# Patient Record
Sex: MALE | Race: WHITE | NOT HISPANIC OR LATINO | Employment: FULL TIME | URBAN - METROPOLITAN AREA
[De-identification: names, ages, dates, MRNs, and addresses within clinical notes are randomized per-mention and may not be internally consistent; named-entity substitution may affect disease eponyms.]

---
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
## 2021-01-04 ENCOUNTER — AMBULATORY - HEALTHEAST (OUTPATIENT)
Dept: OTHER | Facility: CLINIC | Age: 21
End: 2021-01-04

## 2022-12-08 ENCOUNTER — APPOINTMENT (OUTPATIENT)
Dept: CT IMAGING | Facility: HOSPITAL | Age: 22
End: 2022-12-08
Attending: EMERGENCY MEDICINE
Payer: COMMERCIAL

## 2022-12-08 ENCOUNTER — HOSPITAL ENCOUNTER (EMERGENCY)
Facility: HOSPITAL | Age: 22
Discharge: HOME | End: 2022-12-08
Attending: EMERGENCY MEDICINE | Admitting: EMERGENCY MEDICINE
Payer: COMMERCIAL

## 2022-12-08 VITALS
TEMPERATURE: 97.3 F | HEIGHT: 67 IN | BODY MASS INDEX: 24.33 KG/M2 | HEART RATE: 68 BPM | OXYGEN SATURATION: 99 % | DIASTOLIC BLOOD PRESSURE: 93 MMHG | SYSTOLIC BLOOD PRESSURE: 132 MMHG | RESPIRATION RATE: 18 BRPM | WEIGHT: 154.98 LBS

## 2022-12-08 DIAGNOSIS — R10.31 RIGHT LOWER QUADRANT ABDOMINAL PAIN: ICD-10-CM

## 2022-12-08 LAB
ALBUMIN SERPL BCG-MCNC: 4.3 G/DL (ref 3.5–5.2)
ALBUMIN UR-MCNC: 10 MG/DL
ALP SERPL-CCNC: 67 U/L (ref 40–129)
ALT SERPL W P-5'-P-CCNC: 20 U/L (ref 10–50)
ANION GAP SERPL CALCULATED.3IONS-SCNC: 8 MMOL/L (ref 7–15)
APPEARANCE UR: ABNORMAL
AST SERPL W P-5'-P-CCNC: 22 U/L (ref 10–50)
BASOPHILS # BLD AUTO: 0 10E3/UL (ref 0–0.2)
BASOPHILS NFR BLD AUTO: 0 %
BILIRUB SERPL-MCNC: 0.4 MG/DL
BILIRUB UR QL STRIP: NEGATIVE
BUN SERPL-MCNC: 11 MG/DL (ref 6–20)
CALCIUM SERPL-MCNC: 9 MG/DL (ref 8.6–10)
CHLORIDE SERPL-SCNC: 103 MMOL/L (ref 98–107)
COLOR UR AUTO: YELLOW
CREAT SERPL-MCNC: 0.95 MG/DL (ref 0.67–1.17)
DEPRECATED HCO3 PLAS-SCNC: 28 MMOL/L (ref 22–29)
EOSINOPHIL # BLD AUTO: 0.1 10E3/UL (ref 0–0.7)
EOSINOPHIL NFR BLD AUTO: 1 %
ERYTHROCYTE [DISTWIDTH] IN BLOOD BY AUTOMATED COUNT: 12.1 % (ref 10–15)
GFR SERPL CREATININE-BSD FRML MDRD: >90 ML/MIN/1.73M2
GLUCOSE SERPL-MCNC: 103 MG/DL (ref 70–99)
GLUCOSE UR STRIP-MCNC: NEGATIVE MG/DL
HCT VFR BLD AUTO: 40.7 % (ref 40–53)
HGB BLD-MCNC: 14.1 G/DL (ref 13.3–17.7)
HGB UR QL STRIP: NEGATIVE
IMM GRANULOCYTES # BLD: 0 10E3/UL
IMM GRANULOCYTES NFR BLD: 0 %
KETONES UR STRIP-MCNC: NEGATIVE MG/DL
LEUKOCYTE ESTERASE UR QL STRIP: ABNORMAL
LIPASE SERPL-CCNC: 24 U/L (ref 13–60)
LYMPHOCYTES # BLD AUTO: 2.9 10E3/UL (ref 0.8–5.3)
LYMPHOCYTES NFR BLD AUTO: 29 %
MCH RBC QN AUTO: 32 PG (ref 26.5–33)
MCHC RBC AUTO-ENTMCNC: 34.6 G/DL (ref 31.5–36.5)
MCV RBC AUTO: 93 FL (ref 78–100)
MONOCYTES # BLD AUTO: 1.2 10E3/UL (ref 0–1.3)
MONOCYTES NFR BLD AUTO: 12 %
MUCOUS THREADS #/AREA URNS LPF: PRESENT /LPF
NEUTROPHILS # BLD AUTO: 5.6 10E3/UL (ref 1.6–8.3)
NEUTROPHILS NFR BLD AUTO: 58 %
NITRATE UR QL: NEGATIVE
NRBC # BLD AUTO: 0 10E3/UL
NRBC BLD AUTO-RTO: 0 /100
PH UR STRIP: 6.5 [PH] (ref 5–7)
PLATELET # BLD AUTO: 307 10E3/UL (ref 150–450)
POTASSIUM SERPL-SCNC: 4.2 MMOL/L (ref 3.4–5.3)
PROT SERPL-MCNC: 7.1 G/DL (ref 6.4–8.3)
RBC # BLD AUTO: 4.4 10E6/UL (ref 4.4–5.9)
RBC URINE: 1 /HPF
SODIUM SERPL-SCNC: 139 MMOL/L (ref 136–145)
SP GR UR STRIP: 1.03 (ref 1–1.03)
UROBILINOGEN UR STRIP-MCNC: 2 MG/DL
WBC # BLD AUTO: 9.8 10E3/UL (ref 4–11)
WBC URINE: 4 /HPF

## 2022-12-08 PROCEDURE — 99285 EMERGENCY DEPT VISIT HI MDM: CPT | Mod: 25

## 2022-12-08 PROCEDURE — 85025 COMPLETE CBC W/AUTO DIFF WBC: CPT | Performed by: EMERGENCY MEDICINE

## 2022-12-08 PROCEDURE — 36415 COLL VENOUS BLD VENIPUNCTURE: CPT | Performed by: EMERGENCY MEDICINE

## 2022-12-08 PROCEDURE — 250N000011 HC RX IP 250 OP 636: Performed by: EMERGENCY MEDICINE

## 2022-12-08 PROCEDURE — 81001 URINALYSIS AUTO W/SCOPE: CPT | Performed by: EMERGENCY MEDICINE

## 2022-12-08 PROCEDURE — 74177 CT ABD & PELVIS W/CONTRAST: CPT

## 2022-12-08 PROCEDURE — 83690 ASSAY OF LIPASE: CPT | Performed by: EMERGENCY MEDICINE

## 2022-12-08 PROCEDURE — 258N000003 HC RX IP 258 OP 636: Performed by: EMERGENCY MEDICINE

## 2022-12-08 PROCEDURE — 96361 HYDRATE IV INFUSION ADD-ON: CPT

## 2022-12-08 PROCEDURE — 96375 TX/PRO/DX INJ NEW DRUG ADDON: CPT

## 2022-12-08 PROCEDURE — 80053 COMPREHEN METABOLIC PANEL: CPT | Performed by: EMERGENCY MEDICINE

## 2022-12-08 PROCEDURE — 250N000013 HC RX MED GY IP 250 OP 250 PS 637: Performed by: EMERGENCY MEDICINE

## 2022-12-08 PROCEDURE — 96374 THER/PROPH/DIAG INJ IV PUSH: CPT

## 2022-12-08 RX ORDER — DICYCLOMINE HCL 20 MG
20 TABLET ORAL 4 TIMES DAILY PRN
Qty: 10 TABLET | Refills: 0 | Status: SHIPPED | OUTPATIENT
Start: 2022-12-08 | End: 2022-12-18

## 2022-12-08 RX ORDER — HYDROMORPHONE HYDROCHLORIDE 1 MG/ML
0.5 INJECTION, SOLUTION INTRAMUSCULAR; INTRAVENOUS; SUBCUTANEOUS
Status: COMPLETED | OUTPATIENT
Start: 2022-12-08 | End: 2022-12-08

## 2022-12-08 RX ORDER — HYDROCODONE BITARTRATE AND ACETAMINOPHEN 5; 325 MG/1; MG/1
1 TABLET ORAL ONCE
Status: COMPLETED | OUTPATIENT
Start: 2022-12-08 | End: 2022-12-08

## 2022-12-08 RX ORDER — ONDANSETRON 2 MG/ML
4 INJECTION INTRAMUSCULAR; INTRAVENOUS EVERY 30 MIN PRN
Status: DISCONTINUED | OUTPATIENT
Start: 2022-12-08 | End: 2022-12-08 | Stop reason: HOSPADM

## 2022-12-08 RX ORDER — SODIUM CHLORIDE 9 MG/ML
INJECTION, SOLUTION INTRAVENOUS CONTINUOUS
Status: DISCONTINUED | OUTPATIENT
Start: 2022-12-08 | End: 2022-12-08 | Stop reason: HOSPADM

## 2022-12-08 RX ORDER — IOPAMIDOL 755 MG/ML
100 INJECTION, SOLUTION INTRAVASCULAR ONCE
Status: COMPLETED | OUTPATIENT
Start: 2022-12-08 | End: 2022-12-08

## 2022-12-08 RX ORDER — ONDANSETRON 4 MG/1
4 TABLET, ORALLY DISINTEGRATING ORAL EVERY 6 HOURS PRN
Qty: 10 TABLET | Refills: 0 | Status: SHIPPED | OUTPATIENT
Start: 2022-12-08 | End: 2022-12-15

## 2022-12-08 RX ORDER — KETOROLAC TROMETHAMINE 15 MG/ML
15 INJECTION, SOLUTION INTRAMUSCULAR; INTRAVENOUS ONCE
Status: COMPLETED | OUTPATIENT
Start: 2022-12-08 | End: 2022-12-08

## 2022-12-08 RX ADMIN — SODIUM CHLORIDE: 9 INJECTION, SOLUTION INTRAVENOUS at 01:38

## 2022-12-08 RX ADMIN — HYDROMORPHONE HYDROCHLORIDE 0.5 MG: 1 INJECTION, SOLUTION INTRAMUSCULAR; INTRAVENOUS; SUBCUTANEOUS at 01:54

## 2022-12-08 RX ADMIN — IOPAMIDOL 100 ML: 755 INJECTION, SOLUTION INTRAVENOUS at 02:22

## 2022-12-08 RX ADMIN — KETOROLAC TROMETHAMINE 15 MG: 15 INJECTION, SOLUTION INTRAMUSCULAR; INTRAVENOUS at 00:57

## 2022-12-08 RX ADMIN — ONDANSETRON 4 MG: 2 INJECTION INTRAMUSCULAR; INTRAVENOUS at 00:58

## 2022-12-08 RX ADMIN — HYDROCODONE BITARTRATE AND ACETAMINOPHEN 1 TABLET: 5; 325 TABLET ORAL at 03:10

## 2022-12-08 RX ADMIN — SODIUM CHLORIDE 1000 ML: 9 INJECTION, SOLUTION INTRAVENOUS at 00:58

## 2022-12-08 ASSESSMENT — ENCOUNTER SYMPTOMS
NAUSEA: 1
FEVER: 0
VOMITING: 0
ABDOMINAL PAIN: 1
DIARRHEA: 0
BACK PAIN: 0

## 2022-12-08 ASSESSMENT — ACTIVITIES OF DAILY LIVING (ADL)
ADLS_ACUITY_SCORE: 37
ADLS_ACUITY_SCORE: 37

## 2022-12-08 NOTE — ED PROVIDER NOTES
NAME: José Luis Baron  AGE: 22 year old male  YOB: 2000  MRN: [id number]  EVALUATION DATE & TIME: No admission date for patient encounter.    PCP: Ely Feldman    ED PROVIDER: Job Corley M.D.      Chief Complaint   Patient presents with     Abdominal Pain     FINAL IMPRESSION:  1. Right lower quadrant abdominal pain      MEDICAL DECISION MAKIN:30 AM I met with the patient, obtained history, performed an initial exam, and discussed options and plan for diagnostics and treatment here in the ED. PPE worn: n95 mask, gloves    1:42 AM I rechecked the patient. Patient has had some pain relief with the Toradol. Will obtain urine sample and further imaging.   2:58 AM I updated the patient. Patient is agreeable to plan and will discharge.   Patient was clinically assessed and consented to treatment. After assessment, medical decision making and workup were discussed with the patient. The patient was agreeable to plan for testing, workup, and treatment.  Pertinent Labs & Imaging studies reviewed. (See chart for details)     Medical Decision Making    History:    Supplemental history from: N/A    External Record(s) reviewed: Documented in HPI, if applicable.    Work Up:    Chart documentation includes differential considered and any EKGs or imaging interpreted by provider.    In additional to work up documented, I considered the following work up: See chart documentation, if applicable.    External consultation:    Discussion of management with another provider: See chart documentation, if applicable    Complicating factors:    Care impacted by chronic illness: Smoking / Nicotine Use    Care affected by social determinants of health: N/A    Disposition considerations: Discharge. I prescribed additional prescription strength medication(s) as charted. I considered admission, but discharged patient after reassuring labs and/or imaging.    José Luis Baron is a 22 year old male who presents  with abdominal pain.   Differential diagnosis includes but not limited to appendicitis, cholecystitis, biliary colic, epiploic appendagitis, kidney stone, urinary tract infection.  Patient with episodes of pain that are sharp or on the right side.  He reports slightly towards the lower quadrant but when questioned he points between upper and lower quadrant on the right side.  Patient could have retrocolic appendicitis or possibly diverticulitis.  Given the sharp nature kidney stones are also possibility.  Labs were drawn and patient was given Toradol for pain which did allow for some relief.  Labs showed unremarkable CBC, negative lipase for pancreatitis, and metabolic panel without any evidence of kidney issues or LFT elevation.  Given the normal labs will await urine.  Urinalysis showed some leukocytes but no definitive sign of infection with only 4 white blood cells.  No RBCs and could possibly be kidney stone still with no hematuria however will proceed with CT scan with contrast.  CT with contrast showed normal appendix, no signs of acute inflammatory acute surgical process.  Patient's pain possibly bowel related such as bowel gas or gastroenteritis though he does not have any diarrhea.  He did have some slight nausea but no vomiting.  Kidney stone that has already passed is a possibility however urine does not show definitive hematuria.  I discussed all these possibilities with patient and the pain.  Patient pain was improved after single dose pain medication here in the ER.  Allowing for pain medication wore off he still was comfortable and will plan for discharge home with pain control and antiemetics.  Patient will return to the ER if not improving the next 24 hours and follow-up with his primary clinic.    0 minutes of critical care time    MEDICATIONS GIVEN IN THE EMERGENCY:  Medications   0.9% sodium chloride BOLUS (0 mLs Intravenous Stopped 12/8/22 0138)     Followed by   sodium chloride 0.9% infusion  (0 mLs Intravenous Stopped 12/8/22 0311)   ondansetron (ZOFRAN) injection 4 mg (4 mg Intravenous Given 12/8/22 0058)   ketorolac (TORADOL) injection 15 mg (15 mg Intravenous Given 12/8/22 0057)   HYDROmorphone (PF) (DILAUDID) injection 0.5 mg (0.5 mg Intravenous Given 12/8/22 0154)   iopamidol (ISOVUE-370) solution 100 mL (100 mLs Intravenous Given 12/8/22 0222)   HYDROcodone-acetaminophen (NORCO) 5-325 MG per tablet 1 tablet (1 tablet Oral Given 12/8/22 0310)       NEW PRESCRIPTIONS STARTED AT TODAY'S ER VISIT:  Discharge Medication List as of 12/8/2022  3:15 AM      START taking these medications    Details   dicyclomine (BENTYL) 20 MG tablet Take 1 tablet (20 mg) by mouth 4 times daily as needed (abdominal pain, cramps.), Disp-10 tablet, R-0, Local Print      ondansetron (ZOFRAN ODT) 4 MG ODT tab Take 1 tablet (4 mg) by mouth every 6 hours as needed for nausea, Disp-10 tablet, R-0, Local Print                =================================================================    HPI    Patient information was obtained from: Patient     Use of : N/A         José Luis Baron is a 22 year old male with a past medical history of nicotine use disorder, who presents abdominal pain.    Yesterday, the patient reports developing upper abdominal pain. The patient states that his pain was able to go away after taking some time to sleep, but at 12:00 AM (~30 minutes ago), the abdominal pain returned. He adds that his abdominal pain has since radiated to his RLQ, especially when he is laying on his right side. His abdominal pain is sharp and constant in nature. He denies radiation to his back and groin/testicular region. The patient notes that staying in the fetal position helps his abdominal pain minimally. He also endorses some nausea when standing up, but denies vomiting and diarrhea. He has never had these symptoms before and denies a history of abdominal surgeries. He took Tylenol earlier today, but had no relief.  "He otherwise denies fevers and any other symptoms or complaints at this time.    REVIEW OF SYSTEMS   Review of Systems   Constitutional: Negative for fever.   Gastrointestinal: Positive for abdominal pain and nausea. Negative for diarrhea and vomiting.   Genitourinary: Negative for penile pain and testicular pain.   Musculoskeletal: Negative for back pain.   All other systems reviewed and are negative.     PAST MEDICAL HISTORY:  Past Medical History:   Diagnosis Date     Concussion        PAST SURGICAL HISTORY:  Past Surgical History:   Procedure Laterality Date     Left thumb fracture and dislocation         CURRENT MEDICATIONS:      Current Facility-Administered Medications:      ondansetron (ZOFRAN) injection 4 mg, 4 mg, Intravenous, Q30 Min PRN, Job Corley MD, 4 mg at 12/08/22 0058     [COMPLETED] 0.9% sodium chloride BOLUS, 1,000 mL, Intravenous, Once, Stopped at 12/08/22 0138 **FOLLOWED BY** sodium chloride 0.9% infusion, , Intravenous, Continuous, Job Corley MD, Stopped at 12/08/22 0311    Current Outpatient Medications:      dicyclomine (BENTYL) 20 MG tablet, Take 1 tablet (20 mg) by mouth 4 times daily as needed (abdominal pain, cramps.), Disp: 10 tablet, Rfl: 0     ondansetron (ZOFRAN ODT) 4 MG ODT tab, Take 1 tablet (4 mg) by mouth every 6 hours as needed for nausea, Disp: 10 tablet, Rfl: 0    ALLERGIES:  No Known Allergies    FAMILY HISTORY:  Family History   Problem Relation Age of Onset     Asthma Brother        SOCIAL HISTORY:   Social History     Socioeconomic History     Marital status: Single   Tobacco Use     Smoking status: Every Day     PHYSICAL EXAM:    Vitals: BP (!) 132/93   Pulse 68   Temp 97.3  F (36.3  C) (Temporal)   Resp 18   Ht 1.702 m (5' 7\")   Wt 70.3 kg (154 lb 15.7 oz)   SpO2 99%   BMI 24.27 kg/m     Physical Exam  Vitals and nursing note reviewed.   Constitutional:       General: He is not in acute distress.     Appearance: He is " well-developed and normal weight. He is not ill-appearing, toxic-appearing or diaphoretic.   HENT:      Head: Normocephalic.   Eyes:      General: No scleral icterus.  Cardiovascular:      Rate and Rhythm: Normal rate and regular rhythm.      Heart sounds: Normal heart sounds.   Pulmonary:      Effort: Pulmonary effort is normal. No respiratory distress.      Breath sounds: Normal breath sounds. No stridor.   Abdominal:      General: Abdomen is flat.      Palpations: Abdomen is soft.      Tenderness: There is abdominal tenderness (Mid right sided abdominal tenderness) in the right upper quadrant and right lower quadrant. There is no right CVA tenderness, left CVA tenderness, guarding or rebound.      Hernia: No hernia is present.   Skin:     General: Skin is warm and dry.      Coloration: Skin is not jaundiced.   Neurological:      General: No focal deficit present.      Mental Status: He is alert.   Psychiatric:         Mood and Affect: Mood normal.           LAB:  All pertinent labs reviewed and interpreted.  Labs Ordered and Resulted from Time of ED Arrival to Time of ED Departure   COMPREHENSIVE METABOLIC PANEL - Abnormal       Result Value    Sodium 139      Potassium 4.2      Chloride 103      Carbon Dioxide (CO2) 28      Anion Gap 8      Urea Nitrogen 11.0      Creatinine 0.95      Calcium 9.0      Glucose 103 (*)     Alkaline Phosphatase 67      AST 22      ALT 20      Protein Total 7.1      Albumin 4.3      Bilirubin Total 0.4      GFR Estimate >90     ROUTINE UA WITH MICROSCOPIC REFLEX TO CULTURE - Abnormal    Color Urine Yellow      Appearance Urine Turbid (*)     Glucose Urine Negative      Bilirubin Urine Negative      Ketones Urine Negative      Specific Gravity Urine 1.030      Blood Urine Negative      pH Urine 6.5      Protein Albumin Urine 10 (*)     Urobilinogen Urine 2.0 (*)     Nitrite Urine Negative      Leukocyte Esterase Urine 25 Amaury/uL (*)     Mucus Urine Present (*)     RBC Urine 1      WBC  Urine 4     LIPASE - Normal    Lipase 24     CBC WITH PLATELETS AND DIFFERENTIAL    WBC Count 9.8      RBC Count 4.40      Hemoglobin 14.1      Hematocrit 40.7      MCV 93      MCH 32.0      MCHC 34.6      RDW 12.1      Platelet Count 307      % Neutrophils 58      % Lymphocytes 29      % Monocytes 12      % Eosinophils 1      % Basophils 0      % Immature Granulocytes 0      NRBCs per 100 WBC 0      Absolute Neutrophils 5.6      Absolute Lymphocytes 2.9      Absolute Monocytes 1.2      Absolute Eosinophils 0.1      Absolute Basophils 0.0      Absolute Immature Granulocytes 0.0      Absolute NRBCs 0.0         RADIOLOGY:  CT Abdomen Pelvis w Contrast   Final Result   IMPRESSION:    1.  No acute abnormality. No appendicitis or other bowel inflammation or obstruction.        PROCEDURES:   Procedures       I, Fritz Edgar, am serving as a scribe to document services personally performed by Dr. Job Corley  based on my observation and the provider's statements to me. I, Job Corley MD attest that Fritz Edgar is acting in a scribe capacity, has observed my performance of the services and has documented them in accordance with my direction.      Job Corley M.D.  Emergency Medicine  [facility] Emergency Department and [facility] Emergency Department     Job Corley MD  12/08/22 6047

## 2022-12-08 NOTE — ED TRIAGE NOTES
Sharp constant ABD pain starting yesterday morning. Went away after sleeping all day, came back at midnight. Sharp pain to RLQ that radiates to mid ABD at times. Fetal position helps a little. Bowel and bladder WNL. nauseous from time to time.      Triage Assessment     Row Name 12/08/22 0026       Triage Assessment (Adult)    Airway WDL WDL

## 2022-12-09 ENCOUNTER — HOSPITAL ENCOUNTER (EMERGENCY)
Facility: HOSPITAL | Age: 22
Discharge: HOME | End: 2022-12-09
Attending: EMERGENCY MEDICINE | Admitting: EMERGENCY MEDICINE
Payer: COMMERCIAL

## 2022-12-09 ENCOUNTER — APPOINTMENT (OUTPATIENT)
Dept: CT IMAGING | Facility: HOSPITAL | Age: 22
End: 2022-12-09
Attending: EMERGENCY MEDICINE
Payer: COMMERCIAL

## 2022-12-09 VITALS
HEART RATE: 72 BPM | SYSTOLIC BLOOD PRESSURE: 140 MMHG | BODY MASS INDEX: 23.54 KG/M2 | WEIGHT: 150 LBS | RESPIRATION RATE: 22 BRPM | OXYGEN SATURATION: 99 % | TEMPERATURE: 98.8 F | DIASTOLIC BLOOD PRESSURE: 76 MMHG | HEIGHT: 67 IN

## 2022-12-09 DIAGNOSIS — R42 DIZZINESS: ICD-10-CM

## 2022-12-09 LAB
ANION GAP SERPL CALCULATED.3IONS-SCNC: 13 MMOL/L (ref 7–15)
BUN SERPL-MCNC: 8.9 MG/DL (ref 6–20)
CALCIUM SERPL-MCNC: 9.2 MG/DL (ref 8.6–10)
CHLORIDE SERPL-SCNC: 101 MMOL/L (ref 98–107)
COHGB MFR BLD: 1.4 % (ref 0–1.5)
CREAT SERPL-MCNC: 0.87 MG/DL (ref 0.67–1.17)
DEPRECATED HCO3 PLAS-SCNC: 24 MMOL/L (ref 22–29)
ERYTHROCYTE [DISTWIDTH] IN BLOOD BY AUTOMATED COUNT: 12.2 % (ref 10–15)
GFR SERPL CREATININE-BSD FRML MDRD: >90 ML/MIN/1.73M2
GLUCOSE SERPL-MCNC: 113 MG/DL (ref 70–99)
HCT VFR BLD AUTO: 41 % (ref 40–53)
HGB BLD-MCNC: 14.3 G/DL (ref 13.3–17.7)
HOLD SPECIMEN: NORMAL
MAGNESIUM SERPL-MCNC: 1.9 MG/DL (ref 1.7–2.3)
MCH RBC QN AUTO: 32.1 PG (ref 26.5–33)
MCHC RBC AUTO-ENTMCNC: 34.9 G/DL (ref 31.5–36.5)
MCV RBC AUTO: 92 FL (ref 78–100)
PLATELET # BLD AUTO: 329 10E3/UL (ref 150–450)
POTASSIUM SERPL-SCNC: 4 MMOL/L (ref 3.4–5.3)
RBC # BLD AUTO: 4.46 10E6/UL (ref 4.4–5.9)
SODIUM SERPL-SCNC: 138 MMOL/L (ref 136–145)
TROPONIN T SERPL HS-MCNC: <6 NG/L
TSH SERPL DL<=0.005 MIU/L-ACNC: 2.31 UIU/ML (ref 0.3–4.2)
WBC # BLD AUTO: 10.5 10E3/UL (ref 4–11)

## 2022-12-09 PROCEDURE — 99285 EMERGENCY DEPT VISIT HI MDM: CPT | Mod: 25

## 2022-12-09 PROCEDURE — 96361 HYDRATE IV INFUSION ADD-ON: CPT

## 2022-12-09 PROCEDURE — 36415 COLL VENOUS BLD VENIPUNCTURE: CPT | Performed by: EMERGENCY MEDICINE

## 2022-12-09 PROCEDURE — 83735 ASSAY OF MAGNESIUM: CPT | Performed by: EMERGENCY MEDICINE

## 2022-12-09 PROCEDURE — 84443 ASSAY THYROID STIM HORMONE: CPT | Performed by: EMERGENCY MEDICINE

## 2022-12-09 PROCEDURE — 258N000003 HC RX IP 258 OP 636: Performed by: EMERGENCY MEDICINE

## 2022-12-09 PROCEDURE — 85027 COMPLETE CBC AUTOMATED: CPT | Performed by: EMERGENCY MEDICINE

## 2022-12-09 PROCEDURE — 250N000011 HC RX IP 250 OP 636: Performed by: EMERGENCY MEDICINE

## 2022-12-09 PROCEDURE — 84484 ASSAY OF TROPONIN QUANT: CPT | Performed by: EMERGENCY MEDICINE

## 2022-12-09 PROCEDURE — 70450 CT HEAD/BRAIN W/O DYE: CPT

## 2022-12-09 PROCEDURE — 250N000013 HC RX MED GY IP 250 OP 250 PS 637: Performed by: EMERGENCY MEDICINE

## 2022-12-09 PROCEDURE — 82375 ASSAY CARBOXYHB QUANT: CPT | Performed by: EMERGENCY MEDICINE

## 2022-12-09 PROCEDURE — 80048 BASIC METABOLIC PNL TOTAL CA: CPT | Performed by: EMERGENCY MEDICINE

## 2022-12-09 PROCEDURE — 36415 COLL VENOUS BLD VENIPUNCTURE: CPT | Performed by: STUDENT IN AN ORGANIZED HEALTH CARE EDUCATION/TRAINING PROGRAM

## 2022-12-09 PROCEDURE — 96374 THER/PROPH/DIAG INJ IV PUSH: CPT

## 2022-12-09 PROCEDURE — 93005 ELECTROCARDIOGRAM TRACING: CPT | Performed by: EMERGENCY MEDICINE

## 2022-12-09 RX ORDER — MECLIZINE HCL 12.5 MG 12.5 MG/1
25 TABLET ORAL ONCE
Status: COMPLETED | OUTPATIENT
Start: 2022-12-09 | End: 2022-12-09

## 2022-12-09 RX ORDER — CEFTRIAXONE 2 G/1
2 INJECTION, POWDER, FOR SOLUTION INTRAMUSCULAR; INTRAVENOUS ONCE
Status: DISCONTINUED | OUTPATIENT
Start: 2022-12-09 | End: 2022-12-09

## 2022-12-09 RX ORDER — DEXAMETHASONE SODIUM PHOSPHATE 10 MG/ML
10 INJECTION, SOLUTION INTRAMUSCULAR; INTRAVENOUS ONCE
Status: COMPLETED | OUTPATIENT
Start: 2022-12-09 | End: 2022-12-09

## 2022-12-09 RX ADMIN — MECLIZINE 25 MG: 12.5 TABLET ORAL at 20:35

## 2022-12-09 RX ADMIN — DEXAMETHASONE SODIUM PHOSPHATE 10 MG: 10 INJECTION INTRAMUSCULAR; INTRAVENOUS at 20:33

## 2022-12-09 RX ADMIN — SODIUM CHLORIDE 1000 ML: 9 INJECTION, SOLUTION INTRAVENOUS at 18:51

## 2022-12-09 ASSESSMENT — ENCOUNTER SYMPTOMS
WEAKNESS: 0
DIARRHEA: 0
SHORTNESS OF BREATH: 0
HEADACHES: 0
LIGHT-HEADEDNESS: 1
ABDOMINAL PAIN: 0
VOMITING: 0
DYSURIA: 0
NAUSEA: 0
FEVER: 0
NUMBNESS: 0
COUGH: 0

## 2022-12-09 ASSESSMENT — ACTIVITIES OF DAILY LIVING (ADL)
ADLS_ACUITY_SCORE: 37
ADLS_ACUITY_SCORE: 37

## 2022-12-10 NOTE — ED TRIAGE NOTES
"Pt arrives via  EMS from girlfriend's house. Was sitting in garage with propane heater for extended period, then when he stood up he felt lightheaded and had a near-syncope event with SOB and CP while this was happening. Denies CP and SOB currently. Pt reports he felt a \"boom\" in his chest then heart started racing. Hx anxiety but states this does not feel like usual anxiety attacks, is unable to \"walk it off\" like he normally can. Endorses marijuana use this AM. EMS tested CO, result: 1%, fire tested air in garage, CO 4 parts/mil.     Triage Assessment     Row Name 12/09/22 6704       Triage Assessment (Adult)    Airway WDL WDL       Respiratory WDL    Respiratory WDL WDL       Skin Circulation/Temperature WDL    Skin Circulation/Temperature WDL WDL       Cardiac WDL    Cardiac WDL X;rhythm    Pulse Rate & Regularity tachycardic    Cardiac Rhythm NSR       Peripheral/Neurovascular WDL    Peripheral Neurovascular WDL WDL       Cognitive/Neuro/Behavioral WDL    Cognitive/Neuro/Behavioral WDL WDL              "

## 2022-12-10 NOTE — ED PROVIDER NOTES
EMERGENCY DEPARTMENT ENCOUNTER      NAME: José Luis Baron  AGE: 22 year old male  YOB: 2000  MRN: [id number]  EVALUATION DATE & TIME: 2022  6:01 PM    PCP: Ely Feldman    ED PROVIDER: Lety Aguilar M.D.        Chief Complaint   Patient presents with     Tachycardia         FINAL IMPRESSION:    1. Dizziness            MEDICAL DECISION MAKIN year old male with history of anxiety who presents emergency department with episode of palpitations.  He was spending time in the garage with a propane heater and he went to stand up and felt a little lightheaded.  That lasted for about 20 seconds.  But then he had racing heart for about 20 minutes.  All that has since resolved.  Work-up in the ER otherwise unremarkable.  At this time I suspect all this is related to inhaling propane fumes.  Carbon monoxide was negative.  No concerns for acute cardiac dysrhythmia at this time or neurologic process.  Even if he did have a dysrhythmia from possible huffing of fumes the treatment this time would be removal from the situation which has been done.    ED COURSE:  6:28 PM  I met with the patient to gather history and perform my exam. ED course and treatment discussed.    8:37 PM  I reevaluated and updated the patient on results. Discussed plans for discharge.  At this time I suspect that his symptoms are more from inhaling gasoline fumes than anything else.  Carbon monoxide normal.  Neuro exam normal.  Patient feels much better.  I do not suspect acute stroke, intracranial abnormality, ACS, cardiac dysrhythmia, etc.  No sudden death in the family or history of pacemakers.  Patient agrees with plan for discharge and all of his questions have been answered.  He is feeling much better at this time.    I do not think that this represents rib fractures, myocarditis, pericarditis, endocarditis, ACS, PE, ruptured AAA, pneumothorax, aortic dissection, bowel obstruction, bowel ischemia, cholecystitis,  "kidney stone, pyelonephritis, or other such etiologies at this time.  At this time I feel that this patient can be discharged from the emergency department and can followup as directed.      COVID-19 PPE worn during patient evaluation:  Mask: n95 and homemade masks   Eye Protection: goggles   Gown: none   Hair cover: yes  Face shield: none   Patient wearing a mask: yes     At the conclusion of the encounter I discussed the results of all of the tests and the disposition. Their questions were answered. The patient (and any family present) acknowledged understanding and were agreeable with the care plan.      CONSULTANTS:  none        MEDICATIONS GIVEN IN THE EMERGENCY:  Medications   meclizine (ANTIVERT) tablet 25 mg (25 mg Oral Given 12/9/22 2035)   0.9% sodium chloride BOLUS (0 mLs Intravenous Stopped 12/9/22 2025)   dexamethasone PF (DECADRON) injection 10 mg (10 mg Intravenous Given 12/9/22 2033)           NEW PRESCRIPTIONS STARTED AT TODAY'S ER VISIT     Medication List      There are no discharge medications for this visit.             CONDITION:  stable        DISPOSITION:  discharge home         =================================================================  =================================================================  TRIAGE ASSESSMENT:  Pt arrives via  EMS from girlfriend's house. Was sitting in garage with propane heater for extended period, then when he stood up he felt lightheaded and had a near-syncope event with SOB and CP while this was happening. Denies CP and SOB currently. Pt reports he felt a \"boom\" in his chest then heart started racing. Hx anxiety but states this does not feel like usual anxiety attacks, is unable to \"walk it off\" like he normally can. Endorses marijuana use this AM. EMS tested CO, result: 1%, fire tested air in garage, CO 4 parts/mil.     Triage Assessment     Row Name 12/09/22 1806       Triage Assessment (Adult)    Airway WDL WDL       Respiratory WDL    Respiratory " "WDL WDL       Skin Circulation/Temperature WDL    Skin Circulation/Temperature WDL WDL       Cardiac WDL    Cardiac WDL X;rhythm    Pulse Rate & Regularity tachycardic    Cardiac Rhythm NSR       Peripheral/Neurovascular WDL    Peripheral Neurovascular WDL WDL       Cognitive/Neuro/Behavioral WDL    Cognitive/Neuro/Behavioral WDL WDL                     ED Triage Vitals   Enc Vitals Group      BP 12/09/22 1806 (!) 165/96      Pulse 12/09/22 1806 98      Resp 12/09/22 1806 22      Temp --       Temp src --       SpO2 12/09/22 1806 99 %      Weight 12/09/22 1812 68 kg (150 lb)      Height 12/09/22 1812 1.702 m (5' 7\")      Head Circumference --       Peak Flow --       Pain Score --       Pain Loc --       Pain Edu? --       Excl. in GC? --           ================================================================  ================================================================    HPI    Patient information was obtained from: Patient    Use of Intrepreter: N/A     José Luis Baron is a 22 year old male with history of anxiety with panic attacks, depression who presents to the ER with complaints of palpitations. Patient reports he was at his girlfriend's garage earlier today with a propane heater for an extended period of time. When he stood up, patient then felt he was going to pass out for ~20 seconds. Patient then started to develop heart racing palpitations lasting for 20 minutes. Currently, patient states he develops intermittent room spinning dizziness that lasts a few seconds. Patient reports he smoked marijuana earlier this morning. No alcohol use today.    No reported fever, cough, vomiting, diarrhea. No other reported complaints at this time. Patient states he has a history of panic attacks. He states he is otherwise healthy.    Denies any family history of sudden cardiac death or pacemakers.    REVIEW OF SYSTEMS  Review of Systems   Constitutional: Negative for fever.   Respiratory: Negative for cough and " shortness of breath.    Cardiovascular: Negative for chest pain.   Gastrointestinal: Negative for abdominal pain, diarrhea, nausea and vomiting.   Genitourinary: Negative for dysuria.   Allergic/Immunologic: Negative for immunocompromised state.   Neurological: Positive for light-headedness. Negative for weakness, numbness and headaches.   All other systems reviewed and are negative.        PAST MEDICAL HISTORY:  Past Medical History:   Diagnosis Date     Concussion          PAST SURGICAL HISTORY:  Past Surgical History:   Procedure Laterality Date     Left thumb fracture and dislocation           CURRENT MEDICATIONS:    Prior to Admission medications    Medication Sig Start Date End Date Taking? Authorizing Provider   dicyclomine (BENTYL) 20 MG tablet Take 1 tablet (20 mg) by mouth 4 times daily as needed (abdominal pain, cramps.) 12/8/22 12/18/22  Job Corley MD   ondansetron (ZOFRAN ODT) 4 MG ODT tab Take 1 tablet (4 mg) by mouth every 6 hours as needed for nausea 12/8/22 12/15/22  Job Corley MD         ALLERGIES:  No Known Allergies      FAMILY HISTORY:  Family History   Problem Relation Age of Onset     Asthma Brother          SOCIAL HISTORY:  Social History     Socioeconomic History     Marital status: Single     Spouse name: None     Number of children: None     Years of education: None     Highest education level: None   Tobacco Use     Smoking status: Every Day         VITALS:  Patient Vitals for the past 24 hrs:   BP Temp Temp src Pulse Resp SpO2 Height Weight   12/09/22 2100 (!) 140/76 -- -- 72 22 -- -- --   12/09/22 2057 (!) 140/76 -- -- 76 14 99 % -- --   12/09/22 2045 -- -- -- 75 20 -- -- --   12/09/22 2030 -- -- -- 77 16 -- -- --   12/09/22 2015 -- -- -- 74 18 -- -- --   12/09/22 2000 -- -- -- 77 12 -- -- --   12/09/22 1900 (!) 140/79 -- -- 86 24 99 % -- --   12/09/22 1857 -- 98.8  F (37.1  C) Oral -- -- -- -- --   12/09/22 1845 -- -- -- 85 16 99 % -- --   12/09/22  "1830 -- -- -- 95 26 99 % -- --   12/09/22 1812 -- -- -- -- -- -- 1.702 m (5' 7\") 68 kg (150 lb)   12/09/22 1806 (!) 165/96 -- -- 98 22 99 % -- --       Wt Readings from Last 3 Encounters:   12/09/22 68 kg (150 lb)   12/08/22 70.3 kg (154 lb 15.7 oz)   01/05/17 58.9 kg (129 lb 12.8 oz) (17 %, Z= -0.97)*     * Growth percentiles are based on CDC (Boys, 2-20 Years) data.       Estimated Creatinine Clearance: 125.9 mL/min (based on SCr of 0.87 mg/dL).    PHYSICAL EXAM    Constitutional:  Well developed, Well nourished, NAD, GCS 15  HENT:  Normocephalic, Atraumatic, Bilateral external ears normal, Nose normal. Neck- Supple, No stridor.   Eyes:  PERRL, EOMI, Conjunctiva normal, No discharge.  Respiratory:  Normal breath sounds, No respiratory distress, No wheezing, Speaks full sentences easily. No cough.   Cardiovascular:  Normal heart rate, Regular rhythm, No murmurs, No rubs, No gallops.   GI:  No excessive obesity.  Bowel sounds normal, Soft, No tenderness, No masses, No flank tenderness. No rebound or guarding.  : deferred    Musculoskeletal:  No cyanosis, No clubbing. Good range of motion in all major joints. No major deformities noted.   Integument:  Warm, Dry, No erythema, No rash.  No petechiae.  Neurologic:  Alert & oriented x 3, Normal motor function, Normal sensory function, No focal deficits noted.   Psychiatric:  Affect normal, Cooperative       LAB:  All pertinent labs reviewed and interpreted.  Recent Results (from the past 24 hour(s))   Extra Blue Top Tube    Collection Time: 12/09/22  6:15 PM   Result Value Ref Range    Hold Specimen JIC    Extra Red Top Tube    Collection Time: 12/09/22  6:15 PM   Result Value Ref Range    Hold Specimen JIC    Extra Green Top (Lithium Heparin) Tube    Collection Time: 12/09/22  6:15 PM   Result Value Ref Range    Hold Specimen JIC    Extra Purple Top Tube    Collection Time: 12/09/22  6:15 PM   Result Value Ref Range    Hold Specimen JIC    CBC (+ platelets, no diff) "    Collection Time: 12/09/22  6:15 PM   Result Value Ref Range    WBC Count 10.5 4.0 - 11.0 10e3/uL    RBC Count 4.46 4.40 - 5.90 10e6/uL    Hemoglobin 14.3 13.3 - 17.7 g/dL    Hematocrit 41.0 40.0 - 53.0 %    MCV 92 78 - 100 fL    MCH 32.1 26.5 - 33.0 pg    MCHC 34.9 31.5 - 36.5 g/dL    RDW 12.2 10.0 - 15.0 %    Platelet Count 329 150 - 450 10e3/uL   Basic metabolic panel    Collection Time: 12/09/22  6:15 PM   Result Value Ref Range    Sodium 138 136 - 145 mmol/L    Potassium 4.0 3.4 - 5.3 mmol/L    Chloride 101 98 - 107 mmol/L    Carbon Dioxide (CO2) 24 22 - 29 mmol/L    Anion Gap 13 7 - 15 mmol/L    Urea Nitrogen 8.9 6.0 - 20.0 mg/dL    Creatinine 0.87 0.67 - 1.17 mg/dL    Calcium 9.2 8.6 - 10.0 mg/dL    Glucose 113 (H) 70 - 99 mg/dL    GFR Estimate >90 >60 mL/min/1.73m2   Troponin T, High Sensitivity (now)    Collection Time: 12/09/22  6:15 PM   Result Value Ref Range    Troponin T, High Sensitivity <6 <=22 ng/L   Magnesium    Collection Time: 12/09/22  6:15 PM   Result Value Ref Range    Magnesium 1.9 1.7 - 2.3 mg/dL   TSH with free T4 reflex    Collection Time: 12/09/22  6:15 PM   Result Value Ref Range    TSH 2.31 0.30 - 4.20 uIU/mL   Carbon monoxide    Collection Time: 12/09/22  6:52 PM   Result Value Ref Range    Carbon Monoxide 1.4 0.0 - 1.5 %       No results found for: ABORH        RADIOLOGY:  Reviewed all pertinent imaging. Please see official radiology report.    Head CT w/o contrast   Final Result   IMPRESSION:   Unremarkable head CT.            EKG:    Indication: Chest pain    Performed at: 18:08p  Impression: Sinus rhythm at 94 bpm.  Flipped T waves noted in lead aVR and V1.  AR interval 140 ms, QRS 86 ms,  ms.  No delta waves or signs for Brugada.  Nonspecific ST changes compared to prior EKG from December 30, 2020.      I have independently reviewed and interpreted the EKG(s) documented above.        PROCEDURES:  none    Medical Decision Making    History:    Supplemental history from:  N/A    External Record(s) reviewed: Documented in HPI, if applicable.    Work Up:    Chart documentation includes differential considered and any EKGs or imaging interpreted by provider.    In additional to work up documented, I considered the following work up: See chart documentation, if applicable.    External consultation:    Discussion of management with another provider: See chart documentation, if applicable    Complicating factors:    Care impacted by chronic illness: None    Care affected by social determinants of health: N/A    Disposition considerations: Discharge. No recommendations on prescription strength medication(s). I considered admission, but discharged patient after reassuring labs and/or imaging.              I, Darrell Rashid, am serving as a scribe to document services personally performed by Dr. Lety Aguilar based on my observation and the provider's statements to me. I, Dr. Lety Aguilar MD attest that Darrell Rashid is acting in a scribe capacity, has observed my performance of the services and has documented them in accordance with my direction.        Lety Aguilar M.D. [facility]  Emergency Medicine and Medical Toxicology  [facility] EMERGENCY DEPARTMENT  [facility] [facility] [address]  [phone]  Dept: [phone]           Lety Aguilar MD  12/09/22 [id number]

## 2022-12-10 NOTE — DISCHARGE INSTRUCTIONS
All of your tests look great.  I suspect that your symptoms were due to inhaling gasoline fumes in the garage today.  Return emergency department if you have worsening dizziness, chest pain, difficulty breathing, or any other concerns.    Thank you for choosing [facility] Emergency Department.  It has been my pleasure caring for you today.     ~Dr. Lauren MD

## 2023-03-23 ENCOUNTER — HOSPITAL ENCOUNTER (EMERGENCY)
Facility: CLINIC | Age: 23
Discharge: HOME | End: 2023-03-24
Attending: EMERGENCY MEDICINE | Admitting: EMERGENCY MEDICINE
Payer: COMMERCIAL

## 2023-03-23 ENCOUNTER — APPOINTMENT (OUTPATIENT)
Dept: CT IMAGING | Facility: CLINIC | Age: 23
End: 2023-03-23
Attending: EMERGENCY MEDICINE
Payer: COMMERCIAL

## 2023-03-23 DIAGNOSIS — R07.89 ATYPICAL CHEST PAIN: ICD-10-CM

## 2023-03-23 LAB
ALBUMIN SERPL-MCNC: 4 G/DL (ref 3.5–5)
ALP SERPL-CCNC: 60 U/L (ref 45–120)
ALT SERPL W P-5'-P-CCNC: 17 U/L (ref 0–45)
ANION GAP SERPL CALCULATED.3IONS-SCNC: 11 MMOL/L (ref 5–18)
AST SERPL W P-5'-P-CCNC: 20 U/L (ref 0–40)
BASOPHILS # BLD AUTO: 0.1 10E3/UL (ref 0–0.2)
BASOPHILS NFR BLD AUTO: 1 %
BILIRUB SERPL-MCNC: 0.4 MG/DL (ref 0–1)
BUN SERPL-MCNC: 9 MG/DL (ref 8–22)
CALCIUM SERPL-MCNC: 9.2 MG/DL (ref 8.5–10.5)
CHLORIDE BLD-SCNC: 105 MMOL/L (ref 98–107)
CO2 SERPL-SCNC: 21 MMOL/L (ref 22–31)
CREAT SERPL-MCNC: 0.75 MG/DL (ref 0.7–1.3)
EOSINOPHIL # BLD AUTO: 0.1 10E3/UL (ref 0–0.7)
EOSINOPHIL NFR BLD AUTO: 1 %
ERYTHROCYTE [DISTWIDTH] IN BLOOD BY AUTOMATED COUNT: 12.1 % (ref 10–15)
GFR SERPL CREATININE-BSD FRML MDRD: >90 ML/MIN/1.73M2
GLUCOSE BLD-MCNC: 101 MG/DL (ref 70–125)
HCT VFR BLD AUTO: 38.9 % (ref 40–53)
HGB BLD-MCNC: 13.7 G/DL (ref 13.3–17.7)
HOLD SPECIMEN: NORMAL
IMM GRANULOCYTES # BLD: 0 10E3/UL
IMM GRANULOCYTES NFR BLD: 0 %
LYMPHOCYTES # BLD AUTO: 3.7 10E3/UL (ref 0.8–5.3)
LYMPHOCYTES NFR BLD AUTO: 36 %
MCH RBC QN AUTO: 31.4 PG (ref 26.5–33)
MCHC RBC AUTO-ENTMCNC: 35.2 G/DL (ref 31.5–36.5)
MCV RBC AUTO: 89 FL (ref 78–100)
MONOCYTES # BLD AUTO: 1.3 10E3/UL (ref 0–1.3)
MONOCYTES NFR BLD AUTO: 13 %
NEUTROPHILS # BLD AUTO: 4.9 10E3/UL (ref 1.6–8.3)
NEUTROPHILS NFR BLD AUTO: 49 %
NRBC # BLD AUTO: 0 10E3/UL
NRBC BLD AUTO-RTO: 0 /100
PLATELET # BLD AUTO: 307 10E3/UL (ref 150–450)
POTASSIUM BLD-SCNC: 3.4 MMOL/L (ref 3.5–5)
PROT SERPL-MCNC: 7.3 G/DL (ref 6–8)
RBC # BLD AUTO: 4.36 10E6/UL (ref 4.4–5.9)
SODIUM SERPL-SCNC: 137 MMOL/L (ref 136–145)
TROPONIN I SERPL-MCNC: <0.01 NG/ML (ref 0–0.29)
WBC # BLD AUTO: 10 10E3/UL (ref 4–11)

## 2023-03-23 PROCEDURE — 71275 CT ANGIOGRAPHY CHEST: CPT

## 2023-03-23 PROCEDURE — 84484 ASSAY OF TROPONIN QUANT: CPT | Performed by: EMERGENCY MEDICINE

## 2023-03-23 PROCEDURE — 85025 COMPLETE CBC W/AUTO DIFF WBC: CPT | Performed by: EMERGENCY MEDICINE

## 2023-03-23 PROCEDURE — 96361 HYDRATE IV INFUSION ADD-ON: CPT

## 2023-03-23 PROCEDURE — 80053 COMPREHEN METABOLIC PANEL: CPT | Performed by: EMERGENCY MEDICINE

## 2023-03-23 PROCEDURE — 96375 TX/PRO/DX INJ NEW DRUG ADDON: CPT

## 2023-03-23 PROCEDURE — 250N000011 HC RX IP 250 OP 636: Performed by: EMERGENCY MEDICINE

## 2023-03-23 PROCEDURE — 258N000003 HC RX IP 258 OP 636: Performed by: EMERGENCY MEDICINE

## 2023-03-23 PROCEDURE — 250N000013 HC RX MED GY IP 250 OP 250 PS 637: Performed by: EMERGENCY MEDICINE

## 2023-03-23 PROCEDURE — 36415 COLL VENOUS BLD VENIPUNCTURE: CPT | Performed by: EMERGENCY MEDICINE

## 2023-03-23 PROCEDURE — 250N000009 HC RX 250: Performed by: EMERGENCY MEDICINE

## 2023-03-23 PROCEDURE — 93005 ELECTROCARDIOGRAM TRACING: CPT | Performed by: EMERGENCY MEDICINE

## 2023-03-23 PROCEDURE — 96374 THER/PROPH/DIAG INJ IV PUSH: CPT | Mod: 59

## 2023-03-23 PROCEDURE — 99285 EMERGENCY DEPT VISIT HI MDM: CPT | Mod: 25

## 2023-03-23 RX ORDER — LORAZEPAM 2 MG/ML
1 INJECTION INTRAMUSCULAR ONCE
Status: COMPLETED | OUTPATIENT
Start: 2023-03-23 | End: 2023-03-23

## 2023-03-23 RX ORDER — IOPAMIDOL 755 MG/ML
80 INJECTION, SOLUTION INTRAVASCULAR ONCE
Status: COMPLETED | OUTPATIENT
Start: 2023-03-23 | End: 2023-03-23

## 2023-03-23 RX ORDER — SODIUM CHLORIDE 9 MG/ML
INJECTION, SOLUTION INTRAVENOUS CONTINUOUS
Status: DISCONTINUED | OUTPATIENT
Start: 2023-03-23 | End: 2023-03-24 | Stop reason: HOSPADM

## 2023-03-23 RX ORDER — KETOROLAC TROMETHAMINE 15 MG/ML
15 INJECTION, SOLUTION INTRAMUSCULAR; INTRAVENOUS ONCE
Status: COMPLETED | OUTPATIENT
Start: 2023-03-23 | End: 2023-03-23

## 2023-03-23 RX ADMIN — SODIUM CHLORIDE 1000 ML: 9 INJECTION, SOLUTION INTRAVENOUS at 22:18

## 2023-03-23 RX ADMIN — IOPAMIDOL 80 ML: 755 INJECTION, SOLUTION INTRAVENOUS at 23:31

## 2023-03-23 RX ADMIN — KETOROLAC TROMETHAMINE 15 MG: 15 INJECTION, SOLUTION INTRAMUSCULAR; INTRAVENOUS at 22:18

## 2023-03-23 RX ADMIN — ALUMINUM HYDROXIDE, MAGNESIUM HYDROXIDE, AND DIMETHICONE 30 ML: 200; 20; 200 SUSPENSION ORAL at 22:18

## 2023-03-23 RX ADMIN — LORAZEPAM 1 MG: 2 INJECTION INTRAMUSCULAR; INTRAVENOUS at 22:18

## 2023-03-23 ASSESSMENT — ACTIVITIES OF DAILY LIVING (ADL): ADLS_ACUITY_SCORE: 37

## 2023-03-23 ASSESSMENT — ENCOUNTER SYMPTOMS
FEVER: 0
SHORTNESS OF BREATH: 1
CHEST TIGHTNESS: 1

## 2023-03-24 VITALS
HEART RATE: 72 BPM | HEIGHT: 67 IN | OXYGEN SATURATION: 98 % | DIASTOLIC BLOOD PRESSURE: 84 MMHG | TEMPERATURE: 98.6 F | RESPIRATION RATE: 18 BRPM | SYSTOLIC BLOOD PRESSURE: 128 MMHG | WEIGHT: 150 LBS | BODY MASS INDEX: 23.54 KG/M2

## 2023-03-24 LAB
ATRIAL RATE - MUSE: 76 BPM
DIASTOLIC BLOOD PRESSURE - MUSE: NORMAL MMHG
INTERPRETATION ECG - MUSE: NORMAL
P AXIS - MUSE: 32 DEGREES
PR INTERVAL - MUSE: 124 MS
QRS DURATION - MUSE: 88 MS
QT - MUSE: 368 MS
QTC - MUSE: 414 MS
R AXIS - MUSE: 80 DEGREES
SYSTOLIC BLOOD PRESSURE - MUSE: NORMAL MMHG
T AXIS - MUSE: 46 DEGREES
VENTRICULAR RATE- MUSE: 76 BPM

## 2023-03-24 RX ORDER — CYCLOBENZAPRINE HCL 10 MG
10 TABLET ORAL 3 TIMES DAILY PRN
Qty: 15 TABLET | Refills: 0 | Status: SHIPPED | OUTPATIENT
Start: 2023-03-24

## 2023-03-24 RX ORDER — FAMOTIDINE 40 MG/1
40 TABLET, FILM COATED ORAL DAILY
Qty: 14 TABLET | Refills: 0 | Status: SHIPPED | OUTPATIENT
Start: 2023-03-24 | End: 2023-04-07

## 2023-03-24 NOTE — DISCHARGE INSTRUCTIONS
The pain in your chest does not seem to be cardiac in nature based on your work-up yesterday and your work-up today with negative laboratory testing including examination for damage to the heart muscle a normal EKG and normal CT scan imaging.  Recommend taking medications as prescribed rest fluids please follow-up with your primary care doctor in the next couple of days for recheck and in follow-up to emergency department visit.  If you have escalation your symptoms or develop distal concern return to the emergency department repeat assessment.

## 2023-03-24 NOTE — ED PROVIDER NOTES
EMERGENCY DEPARTMENT ENCOUNTER      NAME: José Luis Baron  AGE: 22 year old male  YOB: 2000  MRN: [id number]  EVALUATION DATE & TIME: 3/23/2023  9:48 PM    PCP: Ely Feldman    ED PROVIDER: Balta Hayward MD    Chief Complaint   Patient presents with     Shortness of Breath     Chest Wall Pain     FINAL IMPRESSION:  1. Atypical chest pain      ED COURSE & MEDICAL DECISION MAKING:    Pertinent Labs & Imaging studies reviewed. (See chart for details)  22 year old male presents to the Emergency Department for evaluation of ongoing central chest discomfort.  Patient reporting that he had onset of chest discomfort while ascending elevation flying an airplane earlier this week.  Was seen and urgency room yesterday.  Laboratory testing EKG and chest x-ray unremarkable.  Discharged home presents to the emergency department today due to persistent discomfort.  Describes a sharp sometimes stabbing pain to the anterior aspect of his chest.  He feels short of breath.  Pain is worse with deep inspiration.  Reports never had this before.  Does have a history anxiety.  On examination patient noted to be mildly hypertensive.  Vital signs otherwise unremarkable.  EKG obtained at arrival was normal..  Low risk chest pain patient with normal ECG.  I am going to broaden his work-up given the pleuritic component to his symptoms and ongoing discomfort we will perform CTA imaging for further assessment.  In addition we will institute some medication management for treatment of his symptomatology.  Plan for repeat assessment after labs and CTA.  If all negative would anticipate discharge home and follow-up on outpatient basis with his primary care doctor.    1:23 AM  Everything was negative in the emergency department.  Negative ECG.  No signs of ischemia.  No signs of pericarditis.  Troponin was negative.  Now 2 troponins negative with persistent symptoms well over 48 hours.  That combined with patient's  atypical symptoms and lack of cardiac risk factors low risk heart score I think this is unlikely to be ischemic in nature.  No signs of PE on CT.  No pneumonia.  Overall clinical history and examination was consistent with probable musculoskeletal pain.  Could be component of esophagitis.  I think we will discharge the patient home with muscle relaxants and antacid therapy for symptomatic control with recommendations for close follow-up with his primary care doctor.  Patient was comfortable to splenic care.  Reviewed return precaution prior to discharge.    Medical Decision Making    History:    Supplemental history from: Documented in chart, if applicable    External Record(s) reviewed: Documented in chart, if applicable. and Outpatient Record: reviewed outpatient UR room note from yesterday in [facility]    Work Up:    Chart documentation includes differential considered and any EKGs or imaging independently interpreted by provider, where specified.    In additional to work up documented, I considered the following work up: Documented in chart, if applicable.    External consultation:    Discussion of management with another provider: Documented in chart, if applicable    Complicating factors:    Care impacted by chronic illness: N/A    Care affected by social determinants of health: N/A    Disposition considerations: Discharge. I prescribed additional prescription strength medication(s) as charted. See documentation for any additional details.         At the conclusion of the encounter I discussed the results of all of the tests and the disposition. The questions were answered. The patient or family acknowledged understanding and was agreeable with the care plan.       MEDICATIONS GIVEN IN THE EMERGENCY:  Medications   0.9% sodium chloride BOLUS (0 mLs Intravenous Stopped 3/23/23 2250)     Followed by   sodium chloride 0.9% infusion (has no administration in time range)   ketorolac (TORADOL) injection 15 mg (15 mg  Intravenous $Given 3/23/23 2218)   lidocaine (viscous) (XYLOCAINE) 2 % 15 mL, alum & mag hydroxide-simethicone (MAALOX) 15 mL GI Cocktail (30 mLs Oral $Given 3/23/23 2218)   LORazepam (ATIVAN) injection 1 mg (1 mg Intravenous $Given 3/23/23 2218)   iopamidol (ISOVUE-370) solution 80 mL (80 mLs Intravenous $Given 3/23/23 2331)       NEW PRESCRIPTIONS STARTED AT TODAY'S ER VISIT  Discharge Medication List as of 3/24/2023 12:36 AM      START taking these medications    Details   cyclobenzaprine (FLEXERIL) 10 MG tablet Take 1 tablet (10 mg) by mouth 3 times daily as needed for muscle spasms, Disp-15 tablet, R-0, Local Print      famotidine (PEPCID) 40 MG tablet Take 1 tablet (40 mg) by mouth daily for 14 days, Disp-14 tablet, R-0, Local Print                =================================================================    HPI    Patient information was obtained from: Patient      José Luis Baron is a 22 year old male with past med history significant for anxiety presents emergency department for evaluation of anterior chest discomfort.  Earlier this week patient was on a flight.  As he was ascending elevation he had sudden onset of anterior chest pain.  Sharp stabbing pain.  Worse with deep inspiration.  Initially he thought it was simply anxiety as he has had anxiety episodes in the past.  Symptoms have been persistent and continuous since that time.  Separately presented the emergency room yesterday had laboratory testing chest x-ray and ECG which were normal.  Discharged home.  Represents to this emergency department today due to persistence of discomfort.  Patient reports sharp pains in her chest nonradiating with increased discomfort with deep breathing.  Not affected by activity.  Occasionally have a sharp pain to his right jaw area.  That is primarily with chewing.  No fever no chills no cough.  Patient denies additional symptoms.      REVIEW OF SYSTEMS   Review of Systems   Constitutional: Negative for fever.  "  Respiratory: Positive for chest tightness and shortness of breath.    Cardiovascular: Positive for chest pain. Negative for leg swelling.   All other systems reviewed and are negative.       PAST MEDICAL HISTORY:  Past Medical History:   Diagnosis Date     Concussion        PAST SURGICAL HISTORY:  Past Surgical History:   Procedure Laterality Date     Left thumb fracture and dislocation             CURRENT MEDICATIONS:    cyclobenzaprine (FLEXERIL) 10 MG tablet  famotidine (PEPCID) 40 MG tablet        ALLERGIES:  No Known Allergies    FAMILY HISTORY:  Family History   Problem Relation Age of Onset     Asthma Brother        SOCIAL HISTORY:   Social History     Socioeconomic History     Marital status: Single   Tobacco Use     Smoking status: Every Day       VITALS:  /84   Pulse 72   Temp 98.6  F (37  C) (Temporal)   Resp 18   Ht 1.702 m (5' 7\")   Wt 68 kg (150 lb)   SpO2 98%   BMI 23.49 kg/m      PHYSICAL EXAM    PHYSICAL EXAM    Constitutional: Well developed, Well nourished, anxious.    HENT: Normocephalic, Atraumatic, Bilateral external ears normal, Oropharynx normal, mucous membranes moist, Nose normal. Neck-  Normal range of motion, No tenderness, Supple, No stridor.   Eyes: PERRL, EOMI, Conjunctiva normal, No discharge.   Respiratory: Normal breath sounds, No respiratory distress, No wheezing, Speaks full sentences easily. No cough.   Cardiovascular: Normal heart rate, Regular rhythm, No murmurs Chest wall nontender.    GI:  Soft, No tenderness, No masses, No flank tenderness. No rebound or guarding.  : No cva tenderness    Musculoskeletal: 2+ DP pulses. No edema. No cyanosis. Good range of motion in all major joints. No tenderness to palpation. No tenderness of the CTLS spine.   Integument: Warm, Dry, No erythema, No rash. No petechiae.   Neurologic: Alert & oriented x 3, Normal motor function, Normal sensory function, No focal deficits noted.   Psychiatric: Affect normal, Judgment normal, " Mood normal. Cooperative.     LAB:  All pertinent labs reviewed and interpreted.  Results for orders placed or performed during the hospital encounter of 03/23/23   CT Chest Pulmonary Embolism w Contrast    Impression    IMPRESSION:  1.  No evidence for pulmonary emboli.  2.  No evidence for acute pulmonary disease.   Extra Blue Top Tube   Result Value Ref Range    Hold Specimen JIC    Extra Green Top (Lithium Heparin) Tube   Result Value Ref Range    Hold Specimen JIC    Extra Purple Top Tube   Result Value Ref Range    Hold Specimen JIC    Comprehensive metabolic panel   Result Value Ref Range    Sodium 137 136 - 145 mmol/L    Potassium 3.4 (L) 3.5 - 5.0 mmol/L    Chloride 105 98 - 107 mmol/L    Carbon Dioxide (CO2) 21 (L) 22 - 31 mmol/L    Anion Gap 11 5 - 18 mmol/L    Urea Nitrogen 9 8 - 22 mg/dL    Creatinine 0.75 0.70 - 1.30 mg/dL    Calcium 9.2 8.5 - 10.5 mg/dL    Glucose 101 70 - 125 mg/dL    Alkaline Phosphatase 60 45 - 120 U/L    AST 20 0 - 40 U/L    ALT 17 0 - 45 U/L    Protein Total 7.3 6.0 - 8.0 g/dL    Albumin 4.0 3.5 - 5.0 g/dL    Bilirubin Total 0.4 0.0 - 1.0 mg/dL    GFR Estimate >90 >60 mL/min/1.73m2   Result Value Ref Range    Troponin I <0.01 0.00 - 0.29 ng/mL   CBC with platelets and differential   Result Value Ref Range    WBC Count 10.0 4.0 - 11.0 10e3/uL    RBC Count 4.36 (L) 4.40 - 5.90 10e6/uL    Hemoglobin 13.7 13.3 - 17.7 g/dL    Hematocrit 38.9 (L) 40.0 - 53.0 %    MCV 89 78 - 100 fL    MCH 31.4 26.5 - 33.0 pg    MCHC 35.2 31.5 - 36.5 g/dL    RDW 12.1 10.0 - 15.0 %    Platelet Count 307 150 - 450 10e3/uL    % Neutrophils 49 %    % Lymphocytes 36 %    % Monocytes 13 %    % Eosinophils 1 %    % Basophils 1 %    % Immature Granulocytes 0 %    NRBCs per 100 WBC 0 <1 /100    Absolute Neutrophils 4.9 1.6 - 8.3 10e3/uL    Absolute Lymphocytes 3.7 0.8 - 5.3 10e3/uL    Absolute Monocytes 1.3 0.0 - 1.3 10e3/uL    Absolute Eosinophils 0.1 0.0 - 0.7 10e3/uL    Absolute Basophils 0.1 0.0 - 0.2  10e3/uL    Absolute Immature Granulocytes 0.0 <=0.4 10e3/uL    Absolute NRBCs 0.0 10e3/uL       RADIOLOGY:  Reviewed all pertinent imaging. Please see official radiology report.  CT Chest Pulmonary Embolism w Contrast   Final Result   IMPRESSION:   1.  No evidence for pulmonary emboli.   2.  No evidence for acute pulmonary disease.          EKG:    Performed at: 2045    EKG Interpretation    Independently interpreted by me    Rhythm: normal sinus   Rate: normal  Axis: normal  Ectopy: none  Conduction: normal  ST Segments: no acute change  T Waves: no acute change  Q Waves: none    Clinical Impression: no acute changes and normal EKG      I have independently reviewed and interpreted the EKG(s) documented above.    PROCEDURES:   Heart Score for Chest Pain Patients   History Highly Suspicious 2 0    Moderately Suspicious 1     Slightly Suspicious 0    EKG Significant ST depression 2 0    Nonspecific Repolarization 1     Normal 0    Age >65 years 2 0    45 - 65 years 1     <45 years 0    Risk Factors 3 or more risk factors 2 1    1-2 risk factors 1     No known risk factors 0    Troponin >3x normal 2 0    >1 - <3x normal 1     Normal limit 0    Total 1     *Risk factors for atherosclerotic disease:   Hypercholesterolemia, Hypertension, DM, Cigarette smoking, Family History, Obesity  * Significant ST depression defined as changes of 1mm or greater. T wave inversions and ST depression of 0.5mm considered nonspecific       Balta Hayward MD  [facility] EMERGENCY ROOM  [address] [address] [phone]     Balta Hayward MD  03/24/23 0124

## 2023-03-24 NOTE — ED TRIAGE NOTES
"Pt presents to the ED with c/o SOB and chest wall pain that has been going on for 3 days. Pt was seen at urgency room yesterday, and stated that \"everything checked out fine\". Pt states that the symptoms started while at altitude on a flight, 3 days ago.      Triage Assessment     Row Name 03/23/23 2046       Triage Assessment (Adult)    Airway WDL WDL       Respiratory WDL    Respiratory WDL X;rhythm/pattern    Rhythm/Pattern, Respiratory shortness of breath       Skin Circulation/Temperature WDL    Skin Circulation/Temperature WDL WDL       Cardiac WDL    Cardiac WDL X;chest pain       Chest Pain Assessment    Chest Pain Location midsternal       Peripheral/Neurovascular WDL    Peripheral Neurovascular WDL WDL       Cognitive/Neuro/Behavioral WDL    Cognitive/Neuro/Behavioral WDL WDL              "

## 2024-05-06 ENCOUNTER — APPOINTMENT (OUTPATIENT)
Dept: CT IMAGING | Facility: HOSPITAL | Age: 24
End: 2024-05-06
Attending: EMERGENCY MEDICINE

## 2024-05-06 ENCOUNTER — APPOINTMENT (OUTPATIENT)
Dept: RADIOLOGY | Facility: HOSPITAL | Age: 24
End: 2024-05-06
Attending: STUDENT IN AN ORGANIZED HEALTH CARE EDUCATION/TRAINING PROGRAM

## 2024-05-06 ENCOUNTER — HOSPITAL ENCOUNTER (EMERGENCY)
Facility: HOSPITAL | Age: 24
Discharge: HOME | End: 2024-05-06
Attending: EMERGENCY MEDICINE | Admitting: EMERGENCY MEDICINE

## 2024-05-06 VITALS
RESPIRATION RATE: 20 BRPM | HEART RATE: 84 BPM | OXYGEN SATURATION: 97 % | DIASTOLIC BLOOD PRESSURE: 90 MMHG | TEMPERATURE: 98.9 F | BODY MASS INDEX: 24.33 KG/M2 | WEIGHT: 155 LBS | SYSTOLIC BLOOD PRESSURE: 125 MMHG | HEIGHT: 67 IN

## 2024-05-06 DIAGNOSIS — S02.2XXA CLOSED FRACTURE OF NASAL BONE, INITIAL ENCOUNTER: ICD-10-CM
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
## 2024-05-06 LAB
ALBUMIN SERPL BCG-MCNC: 4.3 G/DL (ref 3.5–5.2)
ALBUMIN UR-MCNC: NEGATIVE MG/DL
ALP SERPL-CCNC: 88 U/L (ref 40–150)
ALT SERPL W P-5'-P-CCNC: 23 U/L (ref 0–70)
ANION GAP SERPL CALCULATED.3IONS-SCNC: 9 MMOL/L (ref 7–15)
APPEARANCE UR: CLEAR
AST SERPL W P-5'-P-CCNC: 28 U/L (ref 0–45)
BASOPHILS # BLD AUTO: 0.1 10E3/UL (ref 0–0.2)
BASOPHILS NFR BLD AUTO: 1 %
BILIRUB SERPL-MCNC: 1.3 MG/DL
BILIRUB UR QL STRIP: NEGATIVE
BUN SERPL-MCNC: 9.6 MG/DL (ref 6–20)
CALCIUM SERPL-MCNC: 10 MG/DL (ref 8.6–10)
CHLORIDE SERPL-SCNC: 101 MMOL/L (ref 98–107)
COLOR UR AUTO: NORMAL
CREAT SERPL-MCNC: 0.86 MG/DL (ref 0.67–1.17)
DEPRECATED HCO3 PLAS-SCNC: 26 MMOL/L (ref 22–29)
EGFRCR SERPLBLD CKD-EPI 2021: >90 ML/MIN/1.73M2
EOSINOPHIL # BLD AUTO: 0 10E3/UL (ref 0–0.7)
EOSINOPHIL NFR BLD AUTO: 0 %
ERYTHROCYTE [DISTWIDTH] IN BLOOD BY AUTOMATED COUNT: 12.9 % (ref 10–15)
GLUCOSE SERPL-MCNC: 110 MG/DL (ref 70–99)
GLUCOSE UR STRIP-MCNC: NEGATIVE MG/DL
HCT VFR BLD AUTO: 44.9 % (ref 40–53)
HGB BLD-MCNC: 15.3 G/DL (ref 13.3–17.7)
HGB UR QL STRIP: NEGATIVE
IMM GRANULOCYTES # BLD: 0 10E3/UL
IMM GRANULOCYTES NFR BLD: 0 %
KETONES UR STRIP-MCNC: NEGATIVE MG/DL
LEUKOCYTE ESTERASE UR QL STRIP: NEGATIVE
LYMPHOCYTES # BLD AUTO: 2.3 10E3/UL (ref 0.8–5.3)
LYMPHOCYTES NFR BLD AUTO: 24 %
MCH RBC QN AUTO: 32.1 PG (ref 26.5–33)
MCHC RBC AUTO-ENTMCNC: 34.1 G/DL (ref 31.5–36.5)
MCV RBC AUTO: 94 FL (ref 78–100)
MONOCYTES # BLD AUTO: 1.1 10E3/UL (ref 0–1.3)
MONOCYTES NFR BLD AUTO: 12 %
NEUTROPHILS # BLD AUTO: 6 10E3/UL (ref 1.6–8.3)
NEUTROPHILS NFR BLD AUTO: 63 %
NITRATE UR QL: NEGATIVE
NRBC # BLD AUTO: 0 10E3/UL
NRBC BLD AUTO-RTO: 0 /100
PH UR STRIP: 7 [PH] (ref 5–7)
PLATELET # BLD AUTO: 347 10E3/UL (ref 150–450)
POTASSIUM SERPL-SCNC: 4.5 MMOL/L (ref 3.4–5.3)
PROT SERPL-MCNC: 8.3 G/DL (ref 6.4–8.3)
RBC # BLD AUTO: 4.77 10E6/UL (ref 4.4–5.9)
RBC URINE: <1 /HPF
SODIUM SERPL-SCNC: 136 MMOL/L (ref 135–145)
SP GR UR STRIP: 1.01 (ref 1–1.03)
SQUAMOUS EPITHELIAL: <1 /HPF
UROBILINOGEN UR STRIP-MCNC: <2 MG/DL
WBC # BLD AUTO: 9.6 10E3/UL (ref 4–11)
WBC URINE: 3 /HPF

## 2024-05-06 PROCEDURE — 70450 CT HEAD/BRAIN W/O DYE: CPT

## 2024-05-06 PROCEDURE — 84075 ASSAY ALKALINE PHOSPHATASE: CPT | Performed by: EMERGENCY MEDICINE

## 2024-05-06 PROCEDURE — 99285 EMERGENCY DEPT VISIT HI MDM: CPT | Mod: 25

## 2024-05-06 PROCEDURE — 71260 CT THORAX DX C+: CPT

## 2024-05-06 PROCEDURE — 250N000011 HC RX IP 250 OP 636: Performed by: EMERGENCY MEDICINE

## 2024-05-06 PROCEDURE — 250N000013 HC RX MED GY IP 250 OP 250 PS 637: Performed by: EMERGENCY MEDICINE

## 2024-05-06 PROCEDURE — 81001 URINALYSIS AUTO W/SCOPE: CPT | Performed by: EMERGENCY MEDICINE

## 2024-05-06 PROCEDURE — 85049 AUTOMATED PLATELET COUNT: CPT | Performed by: EMERGENCY MEDICINE

## 2024-05-06 PROCEDURE — 71101 X-RAY EXAM UNILAT RIBS/CHEST: CPT | Mod: RT

## 2024-05-06 PROCEDURE — 36415 COLL VENOUS BLD VENIPUNCTURE: CPT | Performed by: EMERGENCY MEDICINE

## 2024-05-06 RX ORDER — IBUPROFEN 600 MG/1
600 TABLET, FILM COATED ORAL ONCE
Status: COMPLETED | OUTPATIENT
Start: 2024-05-06 | End: 2024-05-06

## 2024-05-06 RX ORDER — ACETAMINOPHEN 325 MG/1
975 TABLET ORAL ONCE
Status: COMPLETED | OUTPATIENT
Start: 2024-05-06 | End: 2024-05-06

## 2024-05-06 RX ORDER — IOPAMIDOL 755 MG/ML
90 INJECTION, SOLUTION INTRAVASCULAR ONCE
Status: COMPLETED | OUTPATIENT
Start: 2024-05-06 | End: 2024-05-06

## 2024-05-06 RX ADMIN — IBUPROFEN 600 MG: 600 TABLET, FILM COATED ORAL at 17:44

## 2024-05-06 RX ADMIN — IOPAMIDOL 90 ML: 755 INJECTION, SOLUTION INTRAVENOUS at 19:31

## 2024-05-06 RX ADMIN — ACETAMINOPHEN 975 MG: 325 TABLET ORAL at 17:44

## 2024-05-06 ASSESSMENT — ACTIVITIES OF DAILY LIVING (ADL)
ADLS_ACUITY_SCORE: 37

## 2024-05-06 ASSESSMENT — COLUMBIA-SUICIDE SEVERITY RATING SCALE - C-SSRS
6. HAVE YOU EVER DONE ANYTHING, STARTED TO DO ANYTHING, OR PREPARED TO DO ANYTHING TO END YOUR LIFE?: NO
1. IN THE PAST MONTH, HAVE YOU WISHED YOU WERE DEAD OR WISHED YOU COULD GO TO SLEEP AND NOT WAKE UP?: NO
2. HAVE YOU ACTUALLY HAD ANY THOUGHTS OF KILLING YOURSELF IN THE PAST MONTH?: NO

## 2024-05-06 NOTE — Clinical Note
José Luis Tod was seen and treated in our emergency department on 5/6/2024.  He may return to work on 05/08/2024.  Injury     If you have any questions or concerns, please don't hesitate to call.      Royer Gilbert MD
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
## 2024-05-07 NOTE — DISCHARGE INSTRUCTIONS
Your blood work was normal  No blood seen in the urinalysis  CT of the head shows a nasal fracture; ENT follow-up  No evidence of rib fracture or kidney injury

## 2025-03-02 ENCOUNTER — HOSPITAL ENCOUNTER (EMERGENCY)
Facility: HOSPITAL | Age: 25
Discharge: HOME | End: 2025-03-02
Attending: EMERGENCY MEDICINE | Admitting: EMERGENCY MEDICINE

## 2025-03-02 ENCOUNTER — APPOINTMENT (OUTPATIENT)
Dept: RADIOLOGY | Facility: HOSPITAL | Age: 25
End: 2025-03-02
Attending: EMERGENCY MEDICINE

## 2025-03-02 VITALS
DIASTOLIC BLOOD PRESSURE: 79 MMHG | HEART RATE: 76 BPM | OXYGEN SATURATION: 97 % | TEMPERATURE: 98.1 F | RESPIRATION RATE: 18 BRPM | SYSTOLIC BLOOD PRESSURE: 134 MMHG

## 2025-03-02 DIAGNOSIS — R07.9 CHEST PAIN: ICD-10-CM

## 2025-03-02 LAB
ANION GAP SERPL CALCULATED.3IONS-SCNC: 11 MMOL/L (ref 7–15)
BUN SERPL-MCNC: 12.2 MG/DL (ref 6–20)
CALCIUM SERPL-MCNC: 10.2 MG/DL (ref 8.8–10.4)
CHLORIDE SERPL-SCNC: 101 MMOL/L (ref 98–107)
CREAT SERPL-MCNC: 1.03 MG/DL (ref 0.67–1.17)
D DIMER PPP FEU-MCNC: 0.31 UG/ML FEU (ref 0–0.5)
EGFRCR SERPLBLD CKD-EPI 2021: >90 ML/MIN/1.73M2
ERYTHROCYTE [DISTWIDTH] IN BLOOD BY AUTOMATED COUNT: 12.6 % (ref 10–15)
GLUCOSE SERPL-MCNC: 98 MG/DL (ref 70–99)
HCO3 SERPL-SCNC: 26 MMOL/L (ref 22–29)
HCT VFR BLD AUTO: 43 % (ref 40–53)
HGB BLD-MCNC: 14.5 G/DL (ref 13.3–17.7)
MAGNESIUM SERPL-MCNC: 2.1 MG/DL (ref 1.7–2.3)
MCH RBC QN AUTO: 30.7 PG (ref 26.5–33)
MCHC RBC AUTO-ENTMCNC: 33.7 G/DL (ref 31.5–36.5)
MCV RBC AUTO: 91 FL (ref 78–100)
PLATELET # BLD AUTO: 370 10E3/UL (ref 150–450)
POTASSIUM SERPL-SCNC: 4.7 MMOL/L (ref 3.4–5.3)
RBC # BLD AUTO: 4.72 10E6/UL (ref 4.4–5.9)
SODIUM SERPL-SCNC: 138 MMOL/L (ref 135–145)
TROPONIN T SERPL HS-MCNC: 8 NG/L
WBC # BLD AUTO: 11.6 10E3/UL (ref 4–11)

## 2025-03-02 PROCEDURE — 99285 EMERGENCY DEPT VISIT HI MDM: CPT | Mod: 25 | Performed by: EMERGENCY MEDICINE

## 2025-03-02 PROCEDURE — 93005 ELECTROCARDIOGRAM TRACING: CPT

## 2025-03-02 PROCEDURE — 80048 BASIC METABOLIC PNL TOTAL CA: CPT

## 2025-03-02 PROCEDURE — 83735 ASSAY OF MAGNESIUM: CPT

## 2025-03-02 PROCEDURE — 85014 HEMATOCRIT: CPT

## 2025-03-02 PROCEDURE — 82374 ASSAY BLOOD CARBON DIOXIDE: CPT

## 2025-03-02 PROCEDURE — 84484 ASSAY OF TROPONIN QUANT: CPT

## 2025-03-02 PROCEDURE — 250N000011 HC RX IP 250 OP 636

## 2025-03-02 PROCEDURE — 71046 X-RAY EXAM CHEST 2 VIEWS: CPT

## 2025-03-02 PROCEDURE — 85048 AUTOMATED LEUKOCYTE COUNT: CPT

## 2025-03-02 PROCEDURE — 96374 THER/PROPH/DIAG INJ IV PUSH: CPT | Performed by: EMERGENCY MEDICINE

## 2025-03-02 PROCEDURE — 36415 COLL VENOUS BLD VENIPUNCTURE: CPT

## 2025-03-02 PROCEDURE — 85379 FIBRIN DEGRADATION QUANT: CPT

## 2025-03-02 PROCEDURE — 96375 TX/PRO/DX INJ NEW DRUG ADDON: CPT | Performed by: EMERGENCY MEDICINE

## 2025-03-02 PROCEDURE — 93005 ELECTROCARDIOGRAM TRACING: CPT | Performed by: EMERGENCY MEDICINE

## 2025-03-02 RX ORDER — LORAZEPAM 1 MG/1
1 TABLET ORAL ONCE
Status: DISCONTINUED | OUTPATIENT
Start: 2025-03-02 | End: 2025-03-02

## 2025-03-02 RX ORDER — LORAZEPAM 2 MG/ML
1 INJECTION INTRAMUSCULAR ONCE
Status: COMPLETED | OUTPATIENT
Start: 2025-03-02 | End: 2025-03-02

## 2025-03-02 RX ORDER — HYDROXYZINE HYDROCHLORIDE 25 MG/1
25 TABLET, FILM COATED ORAL 3 TIMES DAILY PRN
Qty: 30 TABLET | Refills: 0 | Status: SHIPPED | OUTPATIENT
Start: 2025-03-02

## 2025-03-02 RX ORDER — KETOROLAC TROMETHAMINE 15 MG/ML
15 INJECTION, SOLUTION INTRAMUSCULAR; INTRAVENOUS ONCE
Status: COMPLETED | OUTPATIENT
Start: 2025-03-02 | End: 2025-03-02

## 2025-03-02 RX ADMIN — LORAZEPAM 1 MG: 2 INJECTION INTRAMUSCULAR; INTRAVENOUS at 17:54

## 2025-03-02 RX ADMIN — KETOROLAC TROMETHAMINE 15 MG: 15 INJECTION, SOLUTION INTRAMUSCULAR; INTRAVENOUS at 17:52

## 2025-03-02 ASSESSMENT — ACTIVITIES OF DAILY LIVING (ADL): ADLS_ACUITY_SCORE: 41

## 2025-03-02 ASSESSMENT — COLUMBIA-SUICIDE SEVERITY RATING SCALE - C-SSRS
2. HAVE YOU ACTUALLY HAD ANY THOUGHTS OF KILLING YOURSELF IN THE PAST MONTH?: NO
1. IN THE PAST MONTH, HAVE YOU WISHED YOU WERE DEAD OR WISHED YOU COULD GO TO SLEEP AND NOT WAKE UP?: NO
6. HAVE YOU EVER DONE ANYTHING, STARTED TO DO ANYTHING, OR PREPARED TO DO ANYTHING TO END YOUR LIFE?: NO

## 2025-03-02 NOTE — ED PROVIDER NOTES
Emergency Department Encounter   NAME: José Luis Baron  AGE: 24 year old male  YOB: 2000  MRN: [id number]    PCP: Francia Graves  ED PROVIDER: Cindi Joya PA-C    Evaluation Date & Time:   No admission date for patient encounter.    CHIEF COMPLAINT:  Anxiety, Chest Pain, Shortness of Breath, and Tingling      Impression and Plan   MDM: 24-year-old male with a history of ANGELICA, panic disorder, adjustment disorder, and MDD presents for evaluation of anxiety, chest pain, shortness of breath.  Suddenly started while playing cards about 2 hours ago.  When outside, was breathing quickly, and felt tingling around his face and bilateral hands.  Symptoms gotten better.  Continues to have chest pain on the left side of his chest that feels stabbing in nature.  Continues to feel short of breath.  Pleuritic pain.  No palpitations.  On arrival here patient is hypertensive at 154/98, but otherwise vitally stable.  Afebrile.  On my exam patient is anxious appearing.  Unremarkable cardiac and pulmonary exams.  Reproducible left anterior chest wall tenderness.    Low risk for PE but given pleuritic nature will get a D-dimer.  Possible ACS, anxiety, GERD.  Not consistent with acute aortic syndrome.  No cough or URI symptoms to suggest bronchitis, viral illness, pneumonia.  No evidence of volume overload on exam to suggest new onset heart failure.  He has no focal neurologic deficit whatsoever on my exam.  Suspect that his tingling is more likely related to hyperventilation and anxiety.  Patient is otherwise healthy 24-year-old, I have low suspicion for CVA or TIA.  I discussed this with the patient and I do not think that any brain or neck imaging is indicated today which she is agreeable to.  We discussed plans for labs, D-dimer to determine imaging, Toradol and Ativan for symptoms at this time which patient is agreeable to.    Lab work here with very mild leukocytosis of 11.6.  No infectious symptoms though.   No anemia.  No concerning electrolyte abnormality or KETAN.  EKG without evidence of ischemia or arrhythmia, troponin is reassuring at 8.  ACS is unlikely.  Normal magnesium.  Negative D-dimer.  PE is unlikely.  Chest x-ray per my independent interpretation without any consolidation concerning for pneumonia, pneumothorax, pleural effusion.  Supported by radiology read.  See full report below.  No evidence of life-threatening etiology of his symptoms at this time.    I reassessed the patient.  He is feeling better after Toradol and Ativan.  Discussed reassuring laboratory and imaging findings.  Plan to follow-up with PCP.  I placed a referral for him.  He tells me that hydroxyzine has worked well for his anxiety in the past.  I gave him a refill of this as he currently does not have a primary care provider.  Can use Tylenol and ibuprofen as needed.  Reviewed strict return precautions and patient was discharged home in stable condition.    I have staffed the patient with Dr. Jones, ED physician, who will evaluate the patient and agrees with all aspects of today's care.            4:47 PM I met and introduced myself to the patient. I gathered initial history and performed my physical exam. We discussed plan for initial workup.   5:10 PM I have staffed the patient with Dr. Jones, ED physician, who will evaluate the patient and agrees with all aspects of today's care.   6:10 PM I updated the patient on imaging/lab results and discussed discharge.       Medical Decision Making  Obtained supplemental history:Supplemental history obtained?: No  Reviewed external records: External records reviewed?: No  Care impacted by chronic illness:Mental Health  Did you consider but not order tests?: Work up considered but not performed and documented in chart, if applicable  Did you interpret images independently?: Independent interpretation of ECG and images noted in documentation, when applicable.  Consultation discussion with other  provider:Did you involve another provider (consultant, , pharmacy, etc.)?: No  Discharge. I prescribed additional prescription strength medication(s) as charted. N/A.    MIPS (CTPE, Dental pain, Se[place], Sinusitis, Asthma/COPD, Head Trauma): Not Applicable        FINAL IMPRESSION:    ICD-10-CM    1. Chest pain  R07.9 Primary Care Referral            MEDICATIONS GIVEN IN THE EMERGENCY DEPARTMENT:  Medications   ketorolac (TORADOL) injection 15 mg (15 mg Intravenous $Given 3/2/25 1752)   LORazepam (ATIVAN) injection 1 mg (1 mg Intravenous $Given 3/2/25 1754)         NEW PRESCRIPTIONS STARTED AT TODAY'S ED VISIT:  New Prescriptions    HYDROXYZINE HCL (ATARAX) 25 MG TABLET    Take 1 tablet (25 mg) by mouth 3 times daily as needed for anxiety.         HPI   Patient information was obtained from: Patient   Use of Intrepreter: N/A     José Luis Baron is a 24 year old male with a pertinent history of generalized anxiety disorder, panic disorder, adjustment disorder, and MDD who presents to the ED by walk in for evaluation of chest pain and shortness of breath.     About 2 hours ago today, the patient was sitting playing cards when he felt an instant hot flash. He took a deep breath and developed a stabbing chest pain on his mid chest and left chest that worsens with deep breaths. Patient then walked outside for some air and felt his face and bilateral hand go tingling. He then started shaking. In the ED, he endorses ongoing chest pain, shortness of breath, and facial tingling.     Patient has a history of anxiety and states that he can usually calm himself down, but today's symptoms did not feel like his anxiety. He denies any associated leg swelling, cough, hemoptysis, or neck pain today. No recent long travel/plane rides/car rides. He denies any history of blood clots, lung issues or heart issues. He does not take any daily medications. Patient has a PCP with [facility] but has not been seen by them in a while.  He has no other concerns at this time.       REVIEW OF SYSTEMS:  Pertinent positive and negative symptoms per HPI.       Physical Exam     First Vitals:  Patient Vitals for the past 24 hrs:   BP Temp Pulse Resp SpO2   03/02/25 1815 132/80 -- 75 -- 98 %   03/02/25 1800 129/76 -- 82 -- 97 %   03/02/25 1745 (!) 145/85 -- 78 -- 99 %   03/02/25 1740 138/81 -- -- -- --   03/02/25 1626 (!) 154/98 98.1  F (36.7  C) 92 18 99 %       PHYSICAL EXAM:   General Appearance:  Alert, cooperative, no distress, appears stated age  HENT: Normocephalic without obvious deformity, atraumatic. Mucous membranes moist   Eyes: Conjunctiva clear, Lids normal. No discharge.  EOM intact.  Pupils equal reactive to light bilaterally.  No nystagmus.  Respiratory: No distress. Lungs clear to ausculation bilaterally. No wheezes, rhonchi or stridor. Tenderness to palpation of left anterior chest wall without any underlying crepitus or step-offs.  No flail chest.  Cardiovascular: Regular rate and rhythm, no murmur. Normal cap refill. No peripheral edema.    Musculoskeletal: Moving all extremities. No gross deformities. Bilateral calves are equal and non tender.  Integument: Warm, dry, no rashes or lesions  Neurologic: Alert and orientated x3. GCS 15.  Cranial nerves III through XII intact.  5/5 upper and lower extremity strength bilaterally.  Sensation intact to light touch.  No pronator drift.  Normal finger-nose and heel-to-shin.  Ambulating with a smooth gait.  Psych: Normal mood and affect      Results     LAB:  All pertinent labs reviewed and interpreted  Labs Ordered and Resulted from Time of ED Arrival to Time of ED Departure   CBC WITH PLATELETS - Abnormal       Result Value    WBC Count 11.6 (*)     RBC Count 4.72      Hemoglobin 14.5      Hematocrit 43.0      MCV 91      MCH 30.7      MCHC 33.7      RDW 12.6      Platelet Count 370     BASIC METABOLIC PANEL - Normal    Sodium 138      Potassium 4.7      Chloride 101      Carbon Dioxide (CO2)  26      Anion Gap 11      Urea Nitrogen 12.2      Creatinine 1.03      GFR Estimate >90      Calcium 10.2      Glucose 98     D DIMER QUANTITATIVE - Normal    D-Dimer Quantitative 0.31     TROPONIN T, HIGH SENSITIVITY - Normal    Troponin T, High Sensitivity 8     MAGNESIUM - Normal    Magnesium 2.1         RADIOLOGY:  Chest XR,  PA & LAT   Final Result   IMPRESSION: Negative chest.            ECG:    Performed at: 1628    Impression: sinus rhythm    Rate: 76  Rhythm: sinus  CA Interval: 126  QRS Interval: 86  QTc Interval: 393  ST Changes: none  Comparison: no significant change from 3/23/23    EKG results reviewed and interpreted by Dr. Jones, ED MD.       Vin KILGORE, am serving as a scribe to document services personally performed by Cindi Joya PA-C, based on my observation and the provider's statements to me. Cindi KILGORE PA-C attest that Vin Drake is acting in a scribe capacity, has observed my performance of the services and has documented them in accordance with my direction.       Cindi Joya PA-C   Emergency Medicine   [facility] EMERGENCY DEPARTMENT       Cindi Joya PA-C  03/02/25 4105

## 2025-03-02 NOTE — ED TRIAGE NOTES
Pt states 2 hours ago chest pain, SOB and bilat arm tingling. Pt states he has hx of anxiety but this feels worse. Pain in chest with inspiration and sharp in nature. No hx of DVT or PE.      Triage Assessment (Adult)       Row Name 03/02/25 6242          Triage Assessment    Airway WDL WDL        Respiratory WDL    Respiratory WDL WDL        Skin Circulation/Temperature WDL    Skin Circulation/Temperature WDL WDL        Cardiac WDL    Cardiac WDL WDL        Peripheral/Neurovascular WDL    Peripheral Neurovascular WDL WDL        Cognitive/Neuro/Behavioral WDL    Cognitive/Neuro/Behavioral WDL WDL

## 2025-03-02 NOTE — ED PROVIDER NOTES
Emergency Department Midlevel Supervisory Note     I had a face to face encounter with this patient seen by the Advanced Practice Provider (MIGUEL A). I personally made/approved the management plan and take responsibility for the patient management. I personally saw patient and performed a substantive portion of the visit including all aspects of the medical decision making.     MDM  1. Chest pain          ED Course:  24-year-old male with history of anxiety presented to the ED for evaluation of pleuritic chest pain that occurred while at rest that was followed by shortness of breath, dizziness, as well as numbness tingling in his face and extremities.  Here in the ED the patient was slightly hypertensive upon arrival.  He was otherwise hemodynamic stable.  The patient was anxious appearing at the time of my evaluation.  However, he did not appear to be in acute distress or discomfort.  The patient's physical exam was unremarkable.    EKG was obtained which revealed normal sinus rhythm without any concerning ST or T wave changes.    CBC, BMP, magnesium, D-dimer, and troponin were all reassuring.    Chest x-ray was nondiagnostic.    The patient was informed of the reassuring lab, EKG, and chest x-ray results.  He was informed that the shortness of breath, dizziness, and face and arm paresthesias are like related to hyperventilation.  The patient was educated about panic attack/hyperventilation and reassured.  The underlying chest discomfort may be related to the well although a musculoskeletal etiology seems quite likely.  He was informed that the cardiopulmonary etiology does not seem likely, however.  After educating and reassure the patient he felt comfortable returning home.  The patient was instructed to follow-up with his primary care provider for reevaluation or to return back to ED sooner for any worsening pain or other concerning symptoms.       17:10  Cindi Joya PA-C staffed patient with me. I agree with their  assessment and plan of management, and I will see the patient.  17:20  I met with the patient to introduce myself, gather additional history, perform my initial exam, and discuss the plan.     Brief HPI:     José Luis Baron is a 24 year old male who with history of anxiety presents for evaluation of pleuritic chest pain and shortness of breath that started earlier this afternoon while playing cards.  The patient states that the pain was followed by shortness of breath, dizziness/lightheadedness, as well as numbness and tingling in his face and arms.  Patient felt the symptoms were consistent with his previous episodes of anxiety.      Brief Physical Exam: BP (!) 145/85   Pulse 78   Temp 98.1  F (36.7  C)   Resp 18   SpO2 99%   Constitutional:  Alert, in no acute distress. Anxious appearing.   EYES: Conjunctivae clear  HENT:  Atraumatic  Respiratory:  Respirations even, unlabored, in no acute respiratory distress  Cardiovascular:  Regular rate and rhythm, good peripheral perfusion  GI: Soft, non-distended, non-tender  Musculoskeletal:  Moves all 4 extremities equally, grossly symmetrical strength  Integument: Warm & dry. No appreciable rash, erythema.  Neurologic:  Alert & oriented, speech clear and fluent, no focal deficits noted  Psych: Normal mood and affect      Labs and Imaging:  Results for orders placed or performed during the hospital encounter of 03/02/25   Chest XR,  PA & LAT    Impression    IMPRESSION: Negative chest.   CBC with platelets   Result Value Ref Range    WBC Count 11.6 (H) 4.0 - 11.0 10e3/uL    RBC Count 4.72 4.40 - 5.90 10e6/uL    Hemoglobin 14.5 13.3 - 17.7 g/dL    Hematocrit 43.0 40.0 - 53.0 %    MCV 91 78 - 100 fL    MCH 30.7 26.5 - 33.0 pg    MCHC 33.7 31.5 - 36.5 g/dL    RDW 12.6 10.0 - 15.0 %    Platelet Count 370 150 - 450 10e3/uL   Basic metabolic panel   Result Value Ref Range    Sodium 138 135 - 145 mmol/L    Potassium 4.7 3.4 - 5.3 mmol/L    Chloride 101 98 - 107 mmol/L    Carbon  Dioxide (CO2) 26 22 - 29 mmol/L    Anion Gap 11 7 - 15 mmol/L    Urea Nitrogen 12.2 6.0 - 20.0 mg/dL    Creatinine 1.03 0.67 - 1.17 mg/dL    GFR Estimate >90 >60 mL/min/1.73m2    Calcium 10.2 8.8 - 10.4 mg/dL    Glucose 98 70 - 99 mg/dL   D dimer quantitative   Result Value Ref Range    D-Dimer Quantitative 0.31 0.00 - 0.50 ug/mL FEU   Result Value Ref Range    Troponin T, High Sensitivity 8 <=22 ng/L   Result Value Ref Range    Magnesium 2.1 1.7 - 2.3 mg/dL       I have reviewed the relevant laboratory studies above.    I independently interpreted the following imaging study(s):     EKG:   Normal sinus rhythm.  Rate of 76.  Normal QRS.  Normal QT.  No ST or T wave changes.  No significant change compared to EKG on 3/23/2023.    I reviewed and independently interpreted the patient's EKG, with comments made as listed below. Please see scanned EKG for full report.       Lashell Jones DO  [facility] EMERGENCY DEPARTMENT  [address] [facility] [address] [address]  [phone]       Lashell Jones DO  03/02/25 [id number]

## 2025-03-03 LAB
ATRIAL RATE - MUSE: 76 BPM
DIASTOLIC BLOOD PRESSURE - MUSE: NORMAL MMHG
INTERPRETATION ECG - MUSE: NORMAL
P AXIS - MUSE: 18 DEGREES
PR INTERVAL - MUSE: 126 MS
QRS DURATION - MUSE: 86 MS
QT - MUSE: 350 MS
QTC - MUSE: 393 MS
R AXIS - MUSE: 57 DEGREES
SYSTOLIC BLOOD PRESSURE - MUSE: NORMAL MMHG
T AXIS - MUSE: 29 DEGREES
VENTRICULAR RATE- MUSE: 76 BPM

## 2025-03-03 NOTE — DISCHARGE INSTRUCTIONS
You were seen in the emergency department for evaluation of chest pain.  Thankfully no evidence of heart attack, blood clot in the lungs, fluid in the lungs, electrolyte abnormalities.  Could be that you are having some chest wall pain or possibly an exacerbation of your anxiety.    Thankfully at this time we did not find any life-threatening cause of your symptoms.    You can try to use the hydroxyzine if you are experiencing increased anxiety.  You can use Tylenol and ibuprofen as needed for discomfort.    Please follow-up with your primary care provider.    Return to the emergency department for new or worsening chest pain, shortness of breath, loss of consciousness, or any other concerning symptoms.

## 2025-06-28 ENCOUNTER — HEALTH MAINTENANCE LETTER (OUTPATIENT)
Age: 25
End: 2025-06-28